# Patient Record
(demographics unavailable — no encounter records)

---

## 2018-12-30 NOTE — MISCELLANEOUS
Message  spoke with patient she is not interested in coming in for an annual she is doing fine      Active Problems    1  Hypertension (401 9) (I10)   2  Macular degeneration (362 50) (H35 30)   3  Morbid or severe obesity due to excess calories (278 01) (E66 01)   4  Obesity surgery status (V45 86) (Z98 84)   5  Sleep apnea (780 57) (G47 30)   6  Varicose Veins Of Lower Extremities (454 9)    Current Meds   1  Amlodipine Besy-Benazepril HCl - 5-20 MG Oral Capsule; Therapy: 26TNL7258 to (Last Rx:56Ofw4004)  Requested for: 27Kca1447 Ordered   2  Benazepril HCl - 40 MG Oral Tablet; Therapy: 11EVI2986 to (Last Rx:06Jun2011)  Requested for: 27KYU3155 Ordered   3  Furosemide 40 MG Oral Tablet; Therapy: 76RCZ8422 to (Last Rx:70Uyr7372)  Requested for: 61Bto7449 Ordered   4  Metoprolol Succinate  MG Oral Tablet Extended Release 24 Hour; Therapy: 73CSE0559 to (Last Rx:55Ygc8189)  Requested for: 64DAL8319 Ordered   5  Metoprolol Succinate ER 50 MG Oral Tablet Extended Release 24 Hour; Therapy: 16PSD0949 to (Last Rx:10Oeu8275)  Requested for: 16IOF6818 Ordered   6  Omeprazole 20 MG Oral Capsule Delayed Release; TAKE 1 CAPSULE DAILY; Therapy: 34NUD8235 to (Last Rx:29Faj1560)  Requested for: 90AQM1972 Ordered   7  Welchol 625 MG Oral Tablet; Therapy: 65JDS5062 to (Last Rx:21Wll3348)  Requested for: 40FRW1735 Ordered    Allergies    1   Lipitor TABS    Signatures   Electronically signed by : Lauri Juárez, ; Mar 16 2016  9:27AM EST                       (Author)
fever